# Patient Record
(demographics unavailable — no encounter records)

---

## 2024-12-09 NOTE — ASSESSMENT
[FreeTextEntry1] : Discussed BRY Options including wt loss and continue MAD To f/u with dental CPAP does not want. Inspire discussed.  Trial Melatonin.  diet and weight loss discussed

## 2024-12-09 NOTE — HISTORY OF PRESENT ILLNESS
[TextBox_4] : Here for results of 2-night HSS on dental device, had adjustment has had for 6 months and honestly does not feel it has help Feels very tired during the day bad headaches, did see neuro for migraines, using rescue meds more often still with night time awakenings and wakes up fatigued no change in water pill Now on Wegovy. Sleeping poorly and tired. Only sleeping 3-4 hours.

## 2024-12-09 NOTE — DISCUSSION/SUMMARY
[FreeTextEntry1] : Cellulitis leg status post injections for vascular disease. Yfn mild to moderate with MAD. Seizure disorder. Prior breast cancer. Osteoarthritis. Renal stones. Calcium oxylate and Uric Acid. Edema improved.  Still gets intermittently. Urinary incontinence interm. with sleep. Dreams urinating.  Some weight gain.

## 2024-12-09 NOTE — PHYSICAL EXAM
[No Acute Distress] : no acute distress [Normal Oropharynx] : normal oropharynx [Normal Appearance] : normal appearance [No Neck Mass] : no neck mass [Normal Rate/Rhythm] : normal rate/rhythm [Normal S1, S2] : normal s1, s2 [No Murmurs] : no murmurs [No Resp Distress] : no resp distress [Clear to Auscultation Bilaterally] : clear to auscultation bilaterally [Normal to Percussion] : normal to percussion [No Abnormalities] : no abnormalities [Benign] : benign [Normal Gait] : normal gait [No Clubbing] : no clubbing [No Cyanosis] : no cyanosis [Normal Color/ Pigmentation] : normal color/ pigmentation [No Focal Deficits] : no focal deficits [Oriented x3] : oriented x3 [Normal Affect] : normal affect [TextBox_105] : Mild 1+ edema bilaterally.  Small area of ulceration and cellulitis right anterior leg.

## 2024-12-31 NOTE — PROCEDURE
[Fluid Management] : fluid management [FreeTextEntry1] : Sterile straight catheterization was performed to measure a postvoid residual volume which was 20 cc

## 2024-12-31 NOTE — DISCUSSION/SUMMARY
[FreeTextEntry1] :   -PVR normal -Start behavioral and fluid modifications. We discussed the side effects of keppra including somnolence and the possibility that she is not waking up when she needs to void. Recommend setting alarm in middle of night to get up and void to see if this improves symptoms.  -Recommend f/u with Neurology

## 2024-12-31 NOTE — PHYSICAL EXAM
[Chaperone Present] : A chaperone was present in the examining room during all aspects of the physical examination [02399] : A chaperone was present during the pelvic exam. [Labia Majora] : were normal [Normal Appearance] : general appearance was normal [Normal] : normal [FreeTextEntry1] : General: Well, appearing. Alert and orientated. No acute distress HEENT: Normocephalic, atraumatic and extraocular muscles appear to be intact  Neck: Full range of motion, no obvious lymphadenopathy, deformities, or masses noted  Respiratory: Speaking in full sentences comfortably, normal work of breathing and no cough during visit Musculoskeletal: active full range of motion in extremities  Extremities: No upper extremity edema noted Skin: no obvious rash or skin lesions Neuro: Orientated X 3, speech is fluent, normal rate Psych: Normal mood and affect    [Tenderness] : ~T no ~M abdominal tenderness observed [Distended] : not distended [de-identified] : elongated anterior lip of cervix

## 2024-12-31 NOTE — HISTORY OF PRESENT ILLNESS
[Rectal Prolapse] : no [] : years ago [Feelings Of Urinary Urgency] : no [Urinary Frequency More Than Twice At Night (Nocturia)] : no nocturia [Urinary Tract Infection] : no [de-identified] : once per month [de-identified] : 2 [FreeTextEntry1] :  Екатерина reports nocturnal enuresis ~1 time per month. She has a h/o seizures and is on keppra twice daily. She reports somnelence which has been increasing lately. During episodes of enuresis she reports having a dream that she is voiding on toilet and then wakes up wet. She denies any urinary symptoms during the day. She does not wake up to void.    PMH: h/o seizures, HLD PSH: none  kepra 750mg bid  PAIN SCALE 5 OF 10.

## 2025-01-10 NOTE — ASSESSMENT
[FreeTextEntry1] : Flulike illness Rapid COVID strep influenza negative Await RSV COVID PCR and repeat influenza AB PCR  Discussed BRY Options including wt loss and continue MAD To f/u with dental CPAP does not want. Inspire discussed.  Trial Melatonin.  diet and weight loss discussed

## 2025-01-10 NOTE — HISTORY OF PRESENT ILLNESS
[TextBox_4] : acute visit past 2 days Uri, low grade fever cough lost voice daughter is sick   Now on Wegovy.  Only sleeping 3-4 hours.

## 2025-01-28 NOTE — ASSESSMENT
[FreeTextEntry1] : Labs drawn in office today Medications reviewed and renewed. GI F/U due colonoscopy and some hemorrhoidal bleeding For ophthalmology evaluation.

## 2025-01-28 NOTE — HISTORY OF PRESENT ILLNESS
[Former] : former [TextBox_4] : Colonoscopy 2019 seeing Dr Boogie Mammo Yearly GYN Y has appt Optho Has appt.  Derm Y BMD Jan 29 2022  Went to [facility] after last visit for resp illness. Now 90 percent better. Mild PN Drip. All family members got ill. Saw rheumatology diagnosed osteoarthritis had repeat HSS on dental device mild Yfn AHI 14 Occ hemirrhoida bleeding On Wegovy. has seen cardiology in past.   recently saw neuro needs Keppra level    [TextBox_11] : .25 [TextBox_13] : 20 [YearQuit] : 2015

## 2025-01-28 NOTE — HISTORY OF PRESENT ILLNESS
[Former] : former [TextBox_4] : Colonoscopy 2019 seeing Dr Boogie Mammo Yearly GYN Y has appt Optho Has appt.  Derm Y BMD Jan 29 2022  Went to [facility] after last visit for resp illness. Now 90 percent better. Mild PN Drip. All family members got ill. Saw rheumatology diagnosed osteoarthritis had repeat HSS on dental device mild Yfn AHI 14 Occ hemirrhoida bleeding On Wegovy. has seen cardiology in past.   recently saw neuro needs Keppra level    [TextBox_11] : .25 [TextBox_13] : 20 [YearQuit] : 2015

## 2025-01-28 NOTE — PHYSICAL EXAM
[No Acute Distress] : no acute distress [Normal Oropharynx] : normal oropharynx [Normal Appearance] : normal appearance [Supple] : supple [No JVD] : no jvd [Normal S1, S2] : normal s1, s2 [No Murmurs] : no murmurs [Clear to Auscultation Bilaterally] : clear to auscultation bilaterally [Normal to Percussion] : normal to percussion [Benign] : benign [No HSM] : no hsm [No Clubbing] : no clubbing [No Cyanosis] : no cyanosis [No Edema] : no edema [No Focal Deficits] : no focal deficits [Oriented x3] : oriented x3 [TextBox_80] : Breast examination had.

## 2025-01-28 NOTE — DISCUSSION/SUMMARY
[FreeTextEntry1] : Seizure disorder. Prior breast cancer. Osteoarthritis. Renal stones. Calcium oxylate and Uric Acid. Edema improved.  Still gets intermittently. Urinary incontinence interm. with sleep. Dreams urinating.  Some weight gain. BRY on MAD. Abnormal EKG had cardiac workup in the past.

## 2025-01-28 NOTE — PROCEDURE
Please advise on lab results. Changing to [facility] dispensing pharmacy.   Medication: Adderall 20 mg , Lisinopril   last date refilled: 01/05/22   # dispensed: 90, 90  Refills given: 0, 1  Last office visit with Dr. Lowry: 2/2/2022    Next Encounter with :  2/11/2022     Judith Lowry MD     Current Outpatient Medications   Medication Sig Dispense Refill   • amphetamine-dextroamphetamine (ADDERALL) 20 MG tablet Take 1 tablet by mouth 3 times daily. 90 tablet 0   • lisinopril (ZESTRIL) 20 MG tablet Take 1 tablet by mouth daily. 90 tablet 1   • Blood Pressure Monitoring (Blood Pressure Monitor/L Cuff) Misc Check blood pressure as needed 1 each 0     No current facility-administered medications for this visit.       Lab Services on 02/08/2022   Component Date Value Ref Range Status   • Fasting Status 02/08/2022 12  0 - 999 Hours Final   • Glucose 02/08/2022 97  70 - 99 mg/dL Final   • Hemoglobin A1C 02/08/2022 4.6  4.5 - 5.6 % Final      Diabetic Screening  Non Diabetic:             <5.7%  Increased Risk:           5.7-6.4%  Diagnostic For Diabetes:  >6.4%    Diabetic Control  A1C%       eAG mg/dL  6.0            126  6.5            140  7.0            154  7.5            169  8.0            183  8.5            197  9.0            212  9.5            226  10.0           240       [FreeTextEntry1] : EKG NSR no change

## 2025-07-28 NOTE — HISTORY OF PRESENT ILLNESS
[Former] : former [TextBox_4] : Doing well. Had mammo and US Saw GI. Had colonoscopy Saw optho.  Had meds with Dr. Dell Yeager.  On Wegovy via endo. Gets labs.  Not really using MAD. Lost weight 20 pounds.     Saw rheumatology diagnosed osteoarthritis had repeat HSS on dental device mild Yfn AHI 14 On Wegovy. has seen cardiology in past.   recently saw neuro needs Keppra level    [TextBox_11] : .25 [TextBox_13] : 20 [YearQuit] : 2015

## 2025-07-28 NOTE — ASSESSMENT
[FreeTextEntry1] : Consider repeat HSS on RTO not using MAD.  Medications reviewed and renewed. F/U 6 months.